# Patient Record
Sex: FEMALE | Race: WHITE | NOT HISPANIC OR LATINO | Employment: STUDENT | URBAN - METROPOLITAN AREA
[De-identification: names, ages, dates, MRNs, and addresses within clinical notes are randomized per-mention and may not be internally consistent; named-entity substitution may affect disease eponyms.]

---

## 2018-04-23 ENCOUNTER — OFFICE VISIT (OUTPATIENT)
Dept: NEUROLOGY | Facility: CLINIC | Age: 23
End: 2018-04-23
Payer: COMMERCIAL

## 2018-04-23 VITALS
WEIGHT: 124 LBS | HEIGHT: 55 IN | DIASTOLIC BLOOD PRESSURE: 73 MMHG | HEART RATE: 71 BPM | SYSTOLIC BLOOD PRESSURE: 115 MMHG | BODY MASS INDEX: 28.7 KG/M2

## 2018-04-23 DIAGNOSIS — G44.89 CHRONIC MIXED HEADACHE SYNDROME: ICD-10-CM

## 2018-04-23 PROCEDURE — 99205 OFFICE O/P NEW HI 60 MIN: CPT | Mod: S$GLB,,, | Performed by: PSYCHIATRY & NEUROLOGY

## 2018-04-23 PROCEDURE — 99999 PR PBB SHADOW E&M-NEW PATIENT-LVL III: CPT | Mod: PBBFAC,,, | Performed by: PSYCHIATRY & NEUROLOGY

## 2018-04-23 RX ORDER — TOPIRAMATE 25 MG/1
TABLET ORAL
Qty: 120 TABLET | Refills: 0 | Status: SHIPPED | OUTPATIENT
Start: 2018-04-23 | End: 2018-05-22 | Stop reason: SDUPTHER

## 2018-04-23 RX ORDER — SERTRALINE HYDROCHLORIDE 100 MG/1
100 TABLET, FILM COATED ORAL 2 TIMES DAILY
COMMUNITY

## 2018-04-23 RX ORDER — DIAZEPAM 5 MG/1
TABLET ORAL
Qty: 2 TABLET | Refills: 0 | Status: SHIPPED | OUTPATIENT
Start: 2018-04-23

## 2018-04-23 NOTE — LETTER
April 23, 2018      Caitlyn Galicia, BJORN  [address] [facility] [address]           [facility]  [address]  [facility] [address]  Phone: [phone]          Patient: Belia Castillo   MR Number: [id number]   YOB: 1995   Date of Visit: 4/23/2018       Dear Caitlyn aGlicia:    Thank you for referring Belia Castillo to me for evaluation. Attached you will find relevant portions of my assessment and plan of care.    If you have questions, please do not hesitate to call me. I look forward to following Belia Castillo along with you.    Sincerely,    Radha Duran MD    Enclosure  CC:  No Recipients    If you would like to receive this communication electronically, please contact [email or web address] or [phone] to request more information on [employer] Link access.    For providers and/or their staff who would like to refer a patient to [facility], please contact us through our one-stop-shop provider referral line, Demetrio Oh, at [phone].    If you feel you have received this communication in error or would no longer like to receive these types of communications, please e-mail [email or web address]

## 2018-04-23 NOTE — PROGRESS NOTES
"NEUROLOGY CONSULT:  Referring provider: BJORN Galicia  Date of service: 4/23/2018   2:34 PM   Patient name: Belia Castillo  Patient MRN: [id number]    Chief Complaint/Reason for Consultation: dizziness, visual symptoms      History of Present Illness:   Belia Castillo is a 23 y.o. with pmh of anxiety, who presents for episodes of vision changes and dizziness.     Onset about 7 months ago. Episodes have been worsening in ~past month. At one point, it occurred every 3 days.     Episodes start with a lightheaded/dizzy feeling where she feels like she's not fully present. This is followed by nausea. After about 10 min, the dizziness peaks and she will experience tunnel vision that worsens with eye/head movement, which disorients her and makes her feel off balance. The central portion of her vision will be very clear but there's blurriness beyond the edges of the [place]. If no tunnel vision, then she may see shiny/sparkly lights. Vision changes may last about 30 min max and are followed by a pressure-feeling in bilateral temporal-occipital areas. Dizziness may continue for several hours prior to easing up, but she will get a "gross feeling" for 24hrs following each event.     She hasn't paid attention nor has she noticed a particular trigger with even weather, sleep deprivation, or stress. Going from well lit room to dim room may worsen headache part though. Never with complete vision loss or even hemiapnosia.    Has a childhood history of headaches. Only remembers it as retro-orbital pain and headaches resolved with glasses. in middle school.     In the past, she has had similar LH/dizzy symptoms that preceded syncope during her freshman year of college. Told that it was due to overheating. However, no syncopal episodes in past 2 years and does have the feeling of being hot when it happens.      Review of Systems   Constitutional: Positive for malaise/fatigue.   HENT: Positive for tinnitus. Negative for " "congestion, ear pain, hearing loss and nosebleeds.    Eyes: Negative for double vision and pain.   Respiratory: Negative.    Cardiovascular: Negative.    Gastrointestinal: Negative.    Genitourinary: Negative.    Musculoskeletal: Negative.    Neurological: Positive for dizziness, weakness and headaches. Negative for sensory change and speech change.   Psychiatric/Behavioral: Positive for memory loss. Negative for depression and suicidal ideas. The patient is nervous/anxious. The patient does not have insomnia.          Past Medical History:   Diagnosis Date    Anxiety disorder 5/1/2018     No past surgical history on file.    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  Education: college, [employer]. Some stress - applying for internships currently  Tobacco: none  Alcohol: less than 1 drink per day  Illicit substances: none  Living situation: lives on campus    Examination:    /73   Pulse 71   Ht 4' 4.36" (1.33 m)   Wt 56.2 kg (124 lb)   BMI 31.80 kg/m²   .  GENERAL: pleasant, NAD, WDWN. Appropriate mood and affect.   Mental Status: Awake, alert, fully oriented. Patient is a good historian and follows examination well. Normal language function. No neglect.     CRANIAL NERVES:  II: PERRLA. VFF to count, but possible right eye lower nasal defect on wiggling fingers.  III, IV, VI: Gaze conjugate, EOMI  V: Sensation intact and symmetric to light touch V1-V3  VII: Symmetric facial motor function bilaterally  VIII: Intact to finger rub bilaterally  IX: Palate elevates symmetrically  X: Normal cough  XI: Normal shoulder shrug bilaterally  XII: Tongue protrudes midline    MOTOR: No drift. Normal tone and bulk.   ? " Delt Bi Tri WE WF FDI HF KE KF ADF APF   Right 5 5 5 5 5 5 5 5 5 5 5   Left 5 5 5 5 5 5 5 5 5 5 5     SENSATION:  LT: Intact and symmetric in the bilateral upper and lower extremities.    COORDINATION:  Finger to Nose Intact Bilaterally  Rapid alternating movements intact bilaterally  Heel to Shin intact bilaterally    DTRs:  ? Biceps Triceps Brachioradialis Knee Ankle   Right 2+ 2+ 2+ 2+ 2+   Left 2+ 2+ 2+ 2+ 2+   Plantar reflex downgoing bilaterally    GAIT: normal base and stride. Normal tandem.     Data:   Laboratory:  4/17/18 cbc wnl, cmp bun/cr 10/0.53 gfr 136, ast/alt 15/12, tsh 3.270    Imaging: none available for review    Assessment: Migraine w/aura. Mostly likely diagnosis of visual and vertiginous symptoms in setting of headache. Discussed that she can also have migraine aura without headache. Has stressful job and currently under increased stress applying for internships. She has remote history of headaches but no known accompanying neurologic features in the past. Will obtain imaging to eval for possible vascular, structural, or other dysfunction as well. Discussed possible treatment options and patient would like to start with magnesium for daily treatment. If that does not help, she can try topiramate as well.  A PARQ discussion was held for topiramate and mag in the treatment of migraine and all questions/concerns were addressed to patient's satisfaction. Also stressed that topamax can cause some cognitive impairment; may want to hold for a few weeks and give magnesium a chance to work prior to starting topamax. Discussed other alternative of HTN agent or amitriptyline; would like to hold on both these options      Plan:   - MRI brain w/wo  - given topamax titration schedule to 50mg bid  - magnesium 400mg daily now  - given counseling about proper use of abortive agents, recommendations for good sleep hygiene and stress prevention.  - follow up in about 4 weeks      Thank you for allowing me to  participate in the care of Belia Castillo.    I spent more than 60 minutes with the patient during the visit.  More than half of the visit was spent counseling the patient about diagnosis, further w/u, and treatment.

## 2018-04-27 ENCOUNTER — HOSPITAL ENCOUNTER (OUTPATIENT)
Dept: RADIOLOGY | Facility: HOSPITAL | Age: 23
Discharge: HOME | End: 2018-04-27
Attending: PSYCHIATRY & NEUROLOGY
Payer: COMMERCIAL

## 2018-04-27 DIAGNOSIS — G44.89 CHRONIC MIXED HEADACHE SYNDROME: ICD-10-CM

## 2018-04-27 PROCEDURE — 25500020 PHARM REV CODE 255: Performed by: PSYCHIATRY & NEUROLOGY

## 2018-04-27 PROCEDURE — 70553 MRI BRAIN STEM W/O & W/DYE: CPT | Mod: TC

## 2018-04-27 PROCEDURE — 70553 MRI BRAIN STEM W/O & W/DYE: CPT | Mod: 26,,, | Performed by: RADIOLOGY

## 2018-04-27 PROCEDURE — A9585 GADOBUTROL INJECTION: HCPCS | Performed by: PSYCHIATRY & NEUROLOGY

## 2018-04-27 RX ORDER — GADOBUTROL 604.72 MG/ML
6 INJECTION INTRAVENOUS
Status: COMPLETED | OUTPATIENT
Start: 2018-04-27 | End: 2018-04-27

## 2018-04-27 RX ADMIN — GADOBUTROL 6 ML: 604.72 INJECTION INTRAVENOUS at 04:04

## 2018-05-01 PROBLEM — F41.9 ANXIETY DISORDER: Status: ACTIVE | Noted: 2018-05-01

## 2018-05-12 ENCOUNTER — HOSPITAL ENCOUNTER (EMERGENCY)
Facility: OTHER | Age: 23
Discharge: HOME | End: 2018-05-12
Attending: EMERGENCY MEDICINE
Payer: COMMERCIAL

## 2018-05-12 VITALS
RESPIRATION RATE: 18 BRPM | HEART RATE: 90 BPM | WEIGHT: 125.88 LBS | BODY MASS INDEX: 25.38 KG/M2 | OXYGEN SATURATION: 97 % | TEMPERATURE: 98 F | DIASTOLIC BLOOD PRESSURE: 58 MMHG | HEIGHT: 59 IN | SYSTOLIC BLOOD PRESSURE: 107 MMHG

## 2018-05-12 DIAGNOSIS — R11.2 NON-INTRACTABLE VOMITING WITH NAUSEA, UNSPECIFIED VOMITING TYPE: Primary | ICD-10-CM

## 2018-05-12 LAB
B-HCG UR QL: NEGATIVE
CTP QC/QA: YES

## 2018-05-12 PROCEDURE — 25000003 PHARM REV CODE 250: Performed by: PHYSICIAN ASSISTANT

## 2018-05-12 PROCEDURE — 81025 URINE PREGNANCY TEST: CPT | Performed by: EMERGENCY MEDICINE

## 2018-05-12 PROCEDURE — 99283 EMERGENCY DEPT VISIT LOW MDM: CPT

## 2018-05-12 RX ORDER — ONDANSETRON 4 MG/1
4 TABLET, ORALLY DISINTEGRATING ORAL
Status: COMPLETED | OUTPATIENT
Start: 2018-05-12 | End: 2018-05-12

## 2018-05-12 RX ORDER — ONDANSETRON 4 MG/1
4 TABLET, FILM COATED ORAL EVERY 6 HOURS PRN
Qty: 10 TABLET | Refills: 0 | Status: SHIPPED | OUTPATIENT
Start: 2018-05-12

## 2018-05-12 RX ADMIN — ONDANSETRON 4 MG: 4 TABLET, ORALLY DISINTEGRATING ORAL at 07:05

## 2018-05-13 NOTE — ED PROVIDER NOTES
"Encounter Date: 5/12/2018       History     Chief Complaint   Patient presents with    Anxiety     started Topamax today, went to a festival, drank ETOH, still has a "buzz" and is nauseous, states buzz should be gone now, also has numbness to fingertips and around mouth     Patient is a 23-year-old female with anxiety disorder who presents to the ED with nausea.  Patient states she went to wine festival and drank approximately half a bottle of wine.  She states she looked up ingredients that had apples.  She states she is allergic to apples.  She states her last drink was at 3:30 this afternoon.  She states she has had approximately 4 episodes of emesis.  Patient also reports paresthesias to her lips and hands.  She states this happens when she drinks too much.  Of note, patient started taking Topamax for migraines one week ago.  She denies shortness of breath or any pain at this time.       The history is provided by the patient.     Review of patient's allergies indicates:   Allergen Reactions    Latex, natural rubber Hives, Itching and Rash     Past Medical History:   Diagnosis Date    Anxiety disorder 5/1/2018     History reviewed. No pertinent surgical history.  History reviewed. No pertinent family history.  Social History   Substance Use Topics    Smoking status: Never Smoker    Smokeless tobacco: Never Used    Alcohol use Yes     Review of Systems   Constitutional: Negative for chills and fever.   HENT: Negative for congestion and sore throat.    Eyes: Negative for pain.   Respiratory: Negative for shortness of breath.    Cardiovascular: Negative for chest pain.   Gastrointestinal: Positive for nausea and vomiting. Negative for abdominal pain and diarrhea.   Genitourinary: Negative for dysuria.   Musculoskeletal: Negative for arthralgias.   Skin: Negative for rash.   Neurological: Negative for headaches.       Physical Exam     Initial Vitals [05/12/18 1911]   BP Pulse Resp Temp SpO2   111/74 104 20 " 98.1 °F (36.7 °C) 98 %      MAP       86.33         Physical Exam    Constitutional: Vital signs are normal. She is cooperative.   HENT:   Head: Normocephalic and atraumatic.   Eyes: EOM are normal. Pupils are equal, round, and reactive to light.   No nystagmus   Neck: Normal range of motion. Neck supple.   Cardiovascular: Normal rate, regular rhythm and intact distal pulses.   Pulmonary/Chest: Breath sounds normal. She has no wheezes. She has no rhonchi. She has no rales.   Abdominal: Soft. Bowel sounds are normal. There is no tenderness.   Musculoskeletal: Normal range of motion. She exhibits no edema.   Neurological: She is alert and oriented to person, place, and time. GCS eye subscore is 4. GCS verbal subscore is 5. GCS motor subscore is 6.   Normal gait   Skin: Skin is warm and dry. Capillary refill takes less than 2 seconds. No rash noted.   Psychiatric: She has a normal mood and affect. Her behavior is normal.         ED Course   Procedures  Labs Reviewed   POCT URINE PREGNANCY - Normal             Medical Decision Making:   Initial Assessment:   Urgent evaluation of a 23 y.o. female presenting to the emergency department complaining of nausea.  Patient is afebrile, nontoxic appearing and hemodynamically stable.  ED Management:  Patient with reports of nausea and emesis.  Patient is currently drinking water in the exam room.  She does not appear dehydrated.  She is clinically sober.  No signs of an allergic reaction.  No indication for further workup at this time.  We'll provide Zofran here in the ED and send her home with this. I have given specific return precautions. I have discussed the patient with the attending and he/she agrees to the treatment and plan  This note was created using Dragon Medical Dictation. There may be typographical errors secondary to dictation.                       Clinical Impression:     1. Non-intractable vomiting with nausea, unspecified vomiting type                              Lincoln Oliveira PA-C  05/12/18 2325

## 2018-05-13 NOTE — ED TRIAGE NOTES
Pt reports was at a wine tasting festival today, reports last drink at 330pm. Pt reports looking up ingredients to find apples in a particular wine. Pt appears anxious. Pt is AAO x 3, answers questions appropriately

## 2018-05-21 ENCOUNTER — OFFICE VISIT (OUTPATIENT)
Dept: NEUROLOGY | Facility: CLINIC | Age: 23
End: 2018-05-21
Payer: COMMERCIAL

## 2018-05-21 ENCOUNTER — LAB VISIT (OUTPATIENT)
Dept: LAB | Facility: HOSPITAL | Age: 23
End: 2018-05-21
Attending: PSYCHIATRY & NEUROLOGY
Payer: COMMERCIAL

## 2018-05-21 VITALS
HEIGHT: 59 IN | WEIGHT: 124 LBS | SYSTOLIC BLOOD PRESSURE: 114 MMHG | DIASTOLIC BLOOD PRESSURE: 77 MMHG | BODY MASS INDEX: 25 KG/M2 | HEART RATE: 63 BPM

## 2018-05-21 DIAGNOSIS — G43.109 MIGRAINE WITH AURA AND WITHOUT STATUS MIGRAINOSUS, NOT INTRACTABLE: Primary | ICD-10-CM

## 2018-05-21 DIAGNOSIS — Z51.81 THERAPEUTIC DRUG MONITORING: ICD-10-CM

## 2018-05-21 LAB
ALBUMIN SERPL BCP-MCNC: 4.1 G/DL
ALP SERPL-CCNC: 65 U/L
ALT SERPL W/O P-5'-P-CCNC: 10 U/L
ANION GAP SERPL CALC-SCNC: 6 MMOL/L
AST SERPL-CCNC: 14 U/L
BASOPHILS # BLD AUTO: 0.04 K/UL
BASOPHILS NFR BLD: 0.7 %
BILIRUB SERPL-MCNC: 0.6 MG/DL
BUN SERPL-MCNC: 11 MG/DL
CALCIUM SERPL-MCNC: 9.8 MG/DL
CHLORIDE SERPL-SCNC: 108 MMOL/L
CO2 SERPL-SCNC: 25 MMOL/L
CREAT SERPL-MCNC: 0.6 MG/DL
DIFFERENTIAL METHOD: NORMAL
EOSINOPHIL # BLD AUTO: 0.2 K/UL
EOSINOPHIL NFR BLD: 2.7 %
ERYTHROCYTE [DISTWIDTH] IN BLOOD BY AUTOMATED COUNT: 12.9 %
EST. GFR  (AFRICAN AMERICAN): >60 ML/MIN/1.73 M^2
EST. GFR  (NON AFRICAN AMERICAN): >60 ML/MIN/1.73 M^2
GLUCOSE SERPL-MCNC: 87 MG/DL
HCT VFR BLD AUTO: 37.8 %
HGB BLD-MCNC: 12.5 G/DL
IMM GRANULOCYTES # BLD AUTO: 0.02 K/UL
IMM GRANULOCYTES NFR BLD AUTO: 0.3 %
LYMPHOCYTES # BLD AUTO: 1.9 K/UL
LYMPHOCYTES NFR BLD: 31.2 %
MCH RBC QN AUTO: 29.7 PG
MCHC RBC AUTO-ENTMCNC: 33.1 G/DL
MCV RBC AUTO: 90 FL
MONOCYTES # BLD AUTO: 0.4 K/UL
MONOCYTES NFR BLD: 6.8 %
NEUTROPHILS # BLD AUTO: 3.5 K/UL
NEUTROPHILS NFR BLD: 58.3 %
NRBC BLD-RTO: 0 /100 WBC
PLATELET # BLD AUTO: 215 K/UL
PMV BLD AUTO: 10.4 FL
POTASSIUM SERPL-SCNC: 4.1 MMOL/L
PROT SERPL-MCNC: 7.3 G/DL
RBC # BLD AUTO: 4.21 M/UL
SODIUM SERPL-SCNC: 139 MMOL/L
WBC # BLD AUTO: 6.03 K/UL

## 2018-05-21 PROCEDURE — 99999 PR PBB SHADOW E&M-EST. PATIENT-LVL III: CPT | Mod: PBBFAC,,, | Performed by: PSYCHIATRY & NEUROLOGY

## 2018-05-21 PROCEDURE — 36415 COLL VENOUS BLD VENIPUNCTURE: CPT

## 2018-05-21 PROCEDURE — 99214 OFFICE O/P EST MOD 30 MIN: CPT | Mod: S$GLB,,, | Performed by: PSYCHIATRY & NEUROLOGY

## 2018-05-21 PROCEDURE — 85025 COMPLETE CBC W/AUTO DIFF WBC: CPT

## 2018-05-21 PROCEDURE — 3008F BODY MASS INDEX DOCD: CPT | Mod: CPTII,S$GLB,, | Performed by: PSYCHIATRY & NEUROLOGY

## 2018-05-21 PROCEDURE — 80053 COMPREHEN METABOLIC PANEL: CPT

## 2018-05-21 NOTE — PROGRESS NOTES
"Subjective:       Patient ID: Belia Castillo is a 23 y.o. female who presents today for a routine clinic visit for migraine with aura, not intractable.      HPI:  Initially presented 4/23/18 for vision changes and dizziness. Onset about 7 months prior. Episodes start with a lightheaded/dizzy feeling where she feels like she's not fully present. This is followed by nausea. After about 10 min, the dizziness peaks and she will experience tunnel vision that worsens with eye/head movement, which disorients her and makes her feel off balance. The central portion of her vision will be very clear but there's blurriness beyond the edges of the [place]. If no tunnel vision, then she may see shiny/sparkly lights. Vision changes may last about 30 min max and are followed by a pressure-feeling in bilateral temporal-occipital areas. Dizziness may continue for several hours prior to easing up, but she will get a "gross feeling" for 24hrs following each event. Episodes had worsened in ~past month. At one point, it occurred every 3 days. No noticeable trigger, not even with even weather, sleep deprivation, or stress. Going from well lit room to dim room may worsen headache part though. Never with complete vision loss or even hemiapnosia.  Has a childhood history of headaches. Only remembers it as retro-orbital pain and headaches resolved with glasses. in middle school.   In the past, she has had similar LH/dizzy symptoms that preceded syncope during her freshman year of college. Told that it was due to overheating. However, no syncopal episodes in past 2 years and does have the feeling of being hot when it happens.      SUBJECTIVE/TODAY:  Initially tried magnesium and not effective. Then started topamax, which has helped greatly. No further headaches or visual symptoms since starting medication. Currently taking topamax 25mg/50mg without difficulty. Drinking plenty of water.  Stress will be reduced as she is moving off campus with " the roommate that she actually likes. Still working on campus.    Discussed results of MRI.    SOCIAL HISTORY  Social History   Substance Use Topics    Smoking status: Never Smoker    Smokeless tobacco: Never Used    Alcohol use Yes     Living arrangements - the patient lives with roommate, in process of moving  Employment college student, works on campus    ROS  Review of Systems   Constitutional: Positive for malaise/fatigue.   HENT: Positive for tinnitus. Negative for congestion, ear pain, hearing loss and nosebleeds.    Eyes: Negative for double vision and pain.   Respiratory: Negative.    Cardiovascular: Negative.    Gastrointestinal: Negative.    Genitourinary: Negative.    Musculoskeletal: Negative.    Neurological: Negative for sensory change and speech change. Headaches improved  Psychiatric/Behavioral: Negative for depression and suicidal ideas. The patient is nervous/anxious. The patient does not have insomnia.      Current Outpatient Prescriptions on File Prior to Visit   Medication Sig Dispense Refill Last Dose    diazePAM (VALIUM) 5 MG tablet Take 1 tab po 1 hours prior to MRI. Can repeat once. 2 tablet 0 Taking    levonorgestrel (MIRENA) 20 mcg/24 hr (5 years) IUD 1 each by Intrauterine route once.   Taking    ondansetron (ZOFRAN) 4 MG tablet Take 1 tablet (4 mg total) by mouth every 6 (six) hours as needed for Nausea. 10 tablet 0 Taking    sertraline (ZOLOFT) 100 MG tablet Take 100 mg by mouth 2 (two) times daily.   Taking    topiramate (TOPAMAX) 25 MG tablet Start with 25mg qhs x 2 wks, then 25mg bid x 2 wks, 25mg qam and 50mg x 2 wks, then 50mg bid. 120 tablet 0 Taking         Objective:        Neurologic Exam      MENTAL STATUS: grossly intact. Normal language, attention, and memory.   CRANIAL NERVE EXAM: Extraocular muscles are intact.  No facial asymmetry. tongue midline. Shoulder shrug normal b/l There is no dysarthria.   MOTOR EXAM: Normal bulk and tone throughout UE and LE  bilaterally.Strength is 5/5 in all groups in the lower extremities and upper extremities.   SENSORY EXAM: Normal to LT t/o  COORDINATION: Normal finger-to-nose exam.   GAIT: Narrow based and stable.      Imaging:     Results for orders placed during the hospital encounter of 04/27/18   MRI Brain W WO Contrast    Impression No significant intracranial abnormality.    Electronically signed by resident: Dilip Lane  Date:    04/28/2018  Time:    09:15    Electronically signed by: Inderjit Wilkerson MD  Date:    04/28/2018  Time:    09:49             Labs:       No results found for: WBC, RBC, HGB, HCT, MCV, MCH, MCHC, RDW, PLT, MPV, GRAN, LYMPH, MONO, EOS, BASO, EOSINOPHIL, BASOPHIL    Chemistry    No results found for: NA, K, CL, CO2, BUN, CREATININE, GLU No results found for: CALCIUM, ALKPHOS, AST, ALT, BILITOT, ESTGFRAFRICA, EGFRNONAA       Diagnosis/Assessment/Plan:    1. Migraine with aura, w/o status migrainosus, not intractable  · Assessment: under control with topamax  · Imaging: no further imaging. Mri negative for acute/significant findings.  · Labs: cmp, cbc  · Consults: none  · Medications: continue topamax; goal dose 50mg bid. Incidentally also taking zoloft 100mg bid. Can continue OTC for abortive as needed for now.   · Given migraine counseling for maintenance therapy and abortive agents. Patient stated understanding.      RTC with me in 3 months    Over 50% of this 40 minute visit was spent in direct face to face counseling of the patient about diagnosis, further w/u, and symptom management.         There are no diagnoses linked to this encounter.

## 2018-05-25 RX ORDER — TOPIRAMATE 50 MG/1
50 TABLET, FILM COATED ORAL 2 TIMES DAILY
Qty: 60 TABLET | Refills: 3 | Status: SHIPPED | OUTPATIENT
Start: 2018-05-25 | End: 2018-08-24

## 2018-05-29 PROBLEM — G43.109 MIGRAINE WITH AURA AND WITHOUT STATUS MIGRAINOSUS, NOT INTRACTABLE: Status: ACTIVE | Noted: 2018-05-29

## 2018-08-24 ENCOUNTER — OFFICE VISIT (OUTPATIENT)
Dept: NEUROLOGY | Facility: CLINIC | Age: 23
End: 2018-08-24
Payer: COMMERCIAL

## 2018-08-24 ENCOUNTER — LAB VISIT (OUTPATIENT)
Dept: LAB | Facility: HOSPITAL | Age: 23
End: 2018-08-24
Attending: PSYCHIATRY & NEUROLOGY
Payer: COMMERCIAL

## 2018-08-24 VITALS
HEART RATE: 58 BPM | HEIGHT: 59 IN | DIASTOLIC BLOOD PRESSURE: 61 MMHG | SYSTOLIC BLOOD PRESSURE: 103 MMHG | WEIGHT: 118.19 LBS | BODY MASS INDEX: 23.83 KG/M2

## 2018-08-24 DIAGNOSIS — Z51.81 THERAPEUTIC DRUG MONITORING: ICD-10-CM

## 2018-08-24 DIAGNOSIS — G43.109 MIGRAINE WITH AURA AND WITHOUT STATUS MIGRAINOSUS, NOT INTRACTABLE: Primary | ICD-10-CM

## 2018-08-24 LAB
ALBUMIN SERPL BCP-MCNC: 4.3 G/DL
ALP SERPL-CCNC: 73 U/L
ALT SERPL W/O P-5'-P-CCNC: 9 U/L
ANION GAP SERPL CALC-SCNC: 7 MMOL/L
AST SERPL-CCNC: 14 U/L
BASOPHILS # BLD AUTO: 0.05 K/UL
BASOPHILS NFR BLD: 1 %
BILIRUB SERPL-MCNC: 0.5 MG/DL
BUN SERPL-MCNC: 11 MG/DL
CALCIUM SERPL-MCNC: 9.3 MG/DL
CHLORIDE SERPL-SCNC: 108 MMOL/L
CO2 SERPL-SCNC: 24 MMOL/L
CREAT SERPL-MCNC: 0.7 MG/DL
DIFFERENTIAL METHOD: NORMAL
EOSINOPHIL # BLD AUTO: 0.1 K/UL
EOSINOPHIL NFR BLD: 2.5 %
ERYTHROCYTE [DISTWIDTH] IN BLOOD BY AUTOMATED COUNT: 12.7 %
EST. GFR  (AFRICAN AMERICAN): >60 ML/MIN/1.73 M^2
EST. GFR  (NON AFRICAN AMERICAN): >60 ML/MIN/1.73 M^2
GLUCOSE SERPL-MCNC: 86 MG/DL
HCT VFR BLD AUTO: 39.2 %
HGB BLD-MCNC: 13 G/DL
IMM GRANULOCYTES # BLD AUTO: 0.01 K/UL
IMM GRANULOCYTES NFR BLD AUTO: 0.2 %
LYMPHOCYTES # BLD AUTO: 2.1 K/UL
LYMPHOCYTES NFR BLD: 40.3 %
MCH RBC QN AUTO: 29.5 PG
MCHC RBC AUTO-ENTMCNC: 33.2 G/DL
MCV RBC AUTO: 89 FL
MONOCYTES # BLD AUTO: 0.4 K/UL
MONOCYTES NFR BLD: 7 %
NEUTROPHILS # BLD AUTO: 2.5 K/UL
NEUTROPHILS NFR BLD: 49 %
NRBC BLD-RTO: 0 /100 WBC
PLATELET # BLD AUTO: 217 K/UL
PMV BLD AUTO: 10.7 FL
POTASSIUM SERPL-SCNC: 4 MMOL/L
PROT SERPL-MCNC: 7.3 G/DL
RBC # BLD AUTO: 4.4 M/UL
SODIUM SERPL-SCNC: 139 MMOL/L
WBC # BLD AUTO: 5.11 K/UL

## 2018-08-24 PROCEDURE — 99213 OFFICE O/P EST LOW 20 MIN: CPT | Mod: S$GLB,,, | Performed by: PSYCHIATRY & NEUROLOGY

## 2018-08-24 PROCEDURE — 85025 COMPLETE CBC W/AUTO DIFF WBC: CPT

## 2018-08-24 PROCEDURE — 36415 COLL VENOUS BLD VENIPUNCTURE: CPT

## 2018-08-24 PROCEDURE — 3008F BODY MASS INDEX DOCD: CPT | Mod: CPTII,S$GLB,, | Performed by: PSYCHIATRY & NEUROLOGY

## 2018-08-24 PROCEDURE — 99999 PR PBB SHADOW E&M-EST. PATIENT-LVL III: CPT | Mod: PBBFAC,,, | Performed by: PSYCHIATRY & NEUROLOGY

## 2018-08-24 PROCEDURE — 80053 COMPREHEN METABOLIC PANEL: CPT

## 2018-08-24 RX ORDER — RIZATRIPTAN BENZOATE 5 MG/1
TABLET ORAL
Qty: 12 TABLET | Refills: 0 | Status: SHIPPED | OUTPATIENT
Start: 2018-08-24 | End: 2019-03-04 | Stop reason: SDUPTHER

## 2018-08-24 RX ORDER — TOPIRAMATE 50 MG/1
75 TABLET, FILM COATED ORAL 2 TIMES DAILY
Qty: 90 TABLET | Refills: 6 | Status: SHIPPED | OUTPATIENT
Start: 2018-08-24

## 2018-08-24 NOTE — PATIENT INSTRUCTIONS
Rizatriptan tablets  What is this medicine?  RIZATRIPTAN (rye za TRIP tan) is used to treat migraines with or without aura. An aura is a strange feeling or visual disturbance that warns you of an attack. It is not used to prevent migraines.  How should I use this medicine?  This medicine is taken by mouth with a glass of water. Follow the directions on the prescription label. This medicine is taken at the first symptoms of a migraine. It is not for everyday use. If your migraine headache returns after one dose, you can take another dose as directed. You must leave at least 2 hours between doses, and do not take more than 30 mg total in 24 hours. If there is no improvement at all after the first dose, do not take a second dose without talking to your doctor or health care professional. Do not take your medicine more often than directed.  Talk to your pediatrician regarding the use of this medicine in children. While this drug may be prescribed for children as young as 6 years for selected conditions, precautions do apply.  What side effects may I notice from receiving this medicine?  Side effects that you should report to your doctor or health care professional as soon as possible:  · allergic reactions like skin rash, itching or hives, swelling of the face, lips, or tongue  · fast, slow, or irregular heart beat  · increased or decreased blood pressure  · seizures  · severe stomach pain and cramping, bloody diarrhea  · signs and symptoms of a blood clot such as breathing problems; changes in vision; chest pain; severe, sudden headache; pain, swelling, warmth in the leg; trouble speaking; sudden numbness or weakness of the face, arm or leg  · tingling, pain, or numbness in the face, hands, or feet  Side effects that usually do not require medical attention (report to your doctor or health care professional if they continue or are bothersome):  · drowsiness  · dry mouth  · feeling warm, flushing, or redness of the  face  · headache  · muscle cramps, pain  · nausea, vomiting  · unusually weak or tired  What may interact with this medicine?  Do not take this medicine with any of the following medicines:  · amphetamine, dextroamphetamine or cocaine  · dihydroergotamine, ergotamine, ergoloid mesylates, methysergide, or ergot-type medication - do not take within 24 hours of taking rizatriptan  · feverfew  · MAOIs like Carbex, Eldepryl, Marplan, Nardil, and Parnate - do not take rizatriptan within 2 weeks of stopping MAOI therapy.  · other migraine medicines like almotriptan, eletriptan, naratriptan, sumatriptan, zolmitriptan - do not take within 24 hours of taking rizatriptan  · tryptophan  This medicine may also interact with the following medications:  · medicines for mental depression, anxiety or mood problems  · propranolol  What if I miss a dose?  This does not apply; this medicine is not for regular use.  Where should I keep my medicine?  Keep out of the reach of children.  Store at room temperature between 15 and 30 degrees C (59 and 86 degrees F). Keep container tightly closed. Throw away any unused medicine after the expiration date.  What should I tell my health care provider before I take this medicine?  They need to know if you have any of these conditions:  · bowel disease or colitis  · diabetes  · family history of heart disease  · fast or irregular heart beat  · heart or blood vessel disease, angina (chest pain), or previous heart attack  · high blood pressure  · high cholesterol  · history of stroke, transient ischemic attacks (TIAs or mini-strokes), or intracranial bleeding  · kidney or liver disease  · overweight  · poor circulation  · postmenopausal or surgical removal of uterus and ovaries  · Raynaud's disease  · seizure disorder  · an unusual or allergic reaction to rizatriptan, other medicines, foods, dyes, or preservatives  · pregnant or trying to get pregnant  · breast-feeding  What should I watch for while  using this medicine?  Only take this medicine for a migraine headache. Take it if you get warning symptoms or at the start of a migraine attack. It is not for regular use to prevent migraine attacks.  You may get drowsy or dizzy. Do not drive, use machinery, or do anything that needs mental alertness until you know how this medicine affects you. To reduce dizzy or fainting spells, do not sit or stand up quickly, especially if you are an older patient. Alcohol can increase drowsiness, dizziness and flushing. Avoid alcoholic drinks.  Smoking cigarettes may increase the risk of heart-related side effects from using this medicine.  If you take migraine medicines for 10 or more days a month, your migraines may get worse. Keep a diary of headache days and medicine use. Contact your healthcare professional if your migraine attacks occur more frequently.  NOTE:This sheet is a summary. It may not cover all possible information. If you have questions about this medicine, talk to your doctor, pharmacist, or health care provider. Copyright© 2017 [employer]

## 2019-03-05 RX ORDER — RIZATRIPTAN BENZOATE 5 MG/1
TABLET ORAL
Qty: 12 TABLET | Refills: 0 | Status: SHIPPED | OUTPATIENT
Start: 2019-03-05

## 2019-05-03 RX ORDER — TOPIRAMATE 50 MG/1
TABLET, FILM COATED ORAL
Qty: 60 TABLET | Refills: 0 | Status: SHIPPED | OUTPATIENT
Start: 2019-05-03 | End: 2019-06-05 | Stop reason: SDUPTHER

## 2019-06-05 RX ORDER — TOPIRAMATE 50 MG/1
TABLET, FILM COATED ORAL
Qty: 60 TABLET | Refills: 0 | Status: SHIPPED | OUTPATIENT
Start: 2019-06-05

## 2019-06-06 ENCOUNTER — TELEPHONE (OUTPATIENT)
Dept: NEUROLOGY | Facility: CLINIC | Age: 24
End: 2019-06-06

## 2019-06-06 NOTE — TELEPHONE ENCOUNTER
Informed pt she will need to follow up in clinic before further Topamax refills are provided. Pt states she will call back to reschedule.

## 2019-06-06 NOTE — TELEPHONE ENCOUNTER
----- Message from Radha Duran MD sent at 6/5/2019  6:33 PM CDT -----   Hi!  Please tell her that she will need to make an appt to check in for further refills.    Thanks!

## 2019-08-16 ENCOUNTER — TELEPHONE (OUTPATIENT)
Dept: NEUROLOGY | Facility: CLINIC | Age: 24
End: 2019-08-16

## 2019-08-16 NOTE — TELEPHONE ENCOUNTER
----- Message from Kizzy Damian sent at 8/16/2019 11:47 AM CDT -----  Contact: pt  Needs Advice    Reason for call: Pt calling to ask if Dr have a recommendation for a neurologist in [place] she's moving,Thanks        Communication Preference: [phone]
